# Patient Record
Sex: MALE | URBAN - METROPOLITAN AREA
[De-identification: names, ages, dates, MRNs, and addresses within clinical notes are randomized per-mention and may not be internally consistent; named-entity substitution may affect disease eponyms.]

---

## 2021-01-05 ENCOUNTER — OFFICE VISIT (OUTPATIENT)
Dept: URBAN - METROPOLITAN AREA CLINIC 72 | Facility: CLINIC | Age: 51
End: 2021-01-05
Payer: COMMERCIAL

## 2021-01-05 DIAGNOSIS — H52.4 PRESBYOPIA: ICD-10-CM

## 2021-01-05 DIAGNOSIS — E11.9 TYPE 2 DIABETES MELLITUS WITHOUT COMPLICATIONS: Primary | ICD-10-CM

## 2021-01-05 DIAGNOSIS — H25.813 COMBINED FORMS OF AGE-RELATED CATARACT, BILATERAL: ICD-10-CM

## 2021-01-05 PROCEDURE — 92004 COMPRE OPH EXAM NEW PT 1/>: CPT | Performed by: OPTOMETRIST

## 2021-01-05 ASSESSMENT — VISUAL ACUITY
OD: 20/20
OS: 20/20

## 2021-01-05 ASSESSMENT — INTRAOCULAR PRESSURE
OD: 14
OS: 16

## 2021-01-05 NOTE — IMPRESSION/PLAN
Impression: Type 2 diabetes mellitus without complications: P24.2. Plan: No retinopathy found on today's examination. Discussed importance of blood sugar, blood pressure and cholesterol control. Letter with today's examination results sent to managing provider.  RTC 1 year for [facility]